# Patient Record
Sex: MALE | Race: WHITE | NOT HISPANIC OR LATINO | Employment: FULL TIME | URBAN - METROPOLITAN AREA
[De-identification: names, ages, dates, MRNs, and addresses within clinical notes are randomized per-mention and may not be internally consistent; named-entity substitution may affect disease eponyms.]

---

## 2019-07-04 ENCOUNTER — HOSPITAL ENCOUNTER (EMERGENCY)
Facility: MEDICAL CENTER | Age: 59
End: 2019-07-05
Attending: EMERGENCY MEDICINE
Payer: COMMERCIAL

## 2019-07-04 DIAGNOSIS — E86.0 DEHYDRATION: ICD-10-CM

## 2019-07-04 DIAGNOSIS — E03.9 HYPOTHYROIDISM, UNSPECIFIED TYPE: ICD-10-CM

## 2019-07-04 DIAGNOSIS — R55 SYNCOPE AND COLLAPSE: ICD-10-CM

## 2019-07-04 DIAGNOSIS — R93.0 ABNORMAL HEAD CT: ICD-10-CM

## 2019-07-04 DIAGNOSIS — E78.5 HYPERLIPIDEMIA, UNSPECIFIED HYPERLIPIDEMIA TYPE: ICD-10-CM

## 2019-07-04 PROCEDURE — 93005 ELECTROCARDIOGRAM TRACING: CPT | Performed by: EMERGENCY MEDICINE

## 2019-07-05 ENCOUNTER — APPOINTMENT (OUTPATIENT)
Dept: RADIOLOGY | Facility: MEDICAL CENTER | Age: 59
End: 2019-07-05
Attending: EMERGENCY MEDICINE
Payer: COMMERCIAL

## 2019-07-05 VITALS
BODY MASS INDEX: 32.21 KG/M2 | RESPIRATION RATE: 19 BRPM | HEART RATE: 75 BPM | TEMPERATURE: 98.4 F | DIASTOLIC BLOOD PRESSURE: 60 MMHG | WEIGHT: 225 LBS | SYSTOLIC BLOOD PRESSURE: 138 MMHG | OXYGEN SATURATION: 94 % | HEIGHT: 70 IN

## 2019-07-05 LAB
ALBUMIN SERPL BCP-MCNC: 4.2 G/DL (ref 3.2–4.9)
ALBUMIN/GLOB SERPL: 1.6 G/DL
ALP SERPL-CCNC: 63 U/L (ref 30–99)
ALT SERPL-CCNC: 42 U/L (ref 2–50)
ANION GAP SERPL CALC-SCNC: 9 MMOL/L (ref 0–11.9)
AST SERPL-CCNC: 26 U/L (ref 12–45)
BASOPHILS # BLD AUTO: 0.3 % (ref 0–1.8)
BASOPHILS # BLD: 0.03 K/UL (ref 0–0.12)
BILIRUB SERPL-MCNC: 0.3 MG/DL (ref 0.1–1.5)
BUN SERPL-MCNC: 19 MG/DL (ref 8–22)
CALCIUM SERPL-MCNC: 8.9 MG/DL (ref 8.5–10.5)
CHLORIDE SERPL-SCNC: 106 MMOL/L (ref 96–112)
CO2 SERPL-SCNC: 24 MMOL/L (ref 20–33)
CREAT SERPL-MCNC: 1.08 MG/DL (ref 0.5–1.4)
D DIMER PPP IA.FEU-MCNC: 1.05 UG/ML (FEU) (ref 0–0.5)
EKG IMPRESSION: NORMAL
EOSINOPHIL # BLD AUTO: 0.08 K/UL (ref 0–0.51)
EOSINOPHIL NFR BLD: 0.7 % (ref 0–6.9)
ERYTHROCYTE [DISTWIDTH] IN BLOOD BY AUTOMATED COUNT: 41 FL (ref 35.9–50)
GLOBULIN SER CALC-MCNC: 2.7 G/DL (ref 1.9–3.5)
GLUCOSE SERPL-MCNC: 138 MG/DL (ref 65–99)
HCT VFR BLD AUTO: 42.8 % (ref 42–52)
HGB BLD-MCNC: 14 G/DL (ref 14–18)
IMM GRANULOCYTES # BLD AUTO: 0.05 K/UL (ref 0–0.11)
IMM GRANULOCYTES NFR BLD AUTO: 0.5 % (ref 0–0.9)
INR PPP: 1.04 (ref 0.87–1.13)
LIPASE SERPL-CCNC: 21 U/L (ref 11–82)
LYMPHOCYTES # BLD AUTO: 1.28 K/UL (ref 1–4.8)
LYMPHOCYTES NFR BLD: 11.9 % (ref 22–41)
MCH RBC QN AUTO: 30.2 PG (ref 27–33)
MCHC RBC AUTO-ENTMCNC: 32.7 G/DL (ref 33.7–35.3)
MCV RBC AUTO: 92.4 FL (ref 81.4–97.8)
MONOCYTES # BLD AUTO: 0.64 K/UL (ref 0–0.85)
MONOCYTES NFR BLD AUTO: 5.9 % (ref 0–13.4)
NEUTROPHILS # BLD AUTO: 8.69 K/UL (ref 1.82–7.42)
NEUTROPHILS NFR BLD: 80.7 % (ref 44–72)
NRBC # BLD AUTO: 0 K/UL
NRBC BLD-RTO: 0 /100 WBC
PLATELET # BLD AUTO: 192 K/UL (ref 164–446)
PMV BLD AUTO: 8.8 FL (ref 9–12.9)
POTASSIUM SERPL-SCNC: 4.3 MMOL/L (ref 3.6–5.5)
PROT SERPL-MCNC: 6.9 G/DL (ref 6–8.2)
PROTHROMBIN TIME: 13.9 SEC (ref 12–14.6)
RBC # BLD AUTO: 4.63 M/UL (ref 4.7–6.1)
SODIUM SERPL-SCNC: 139 MMOL/L (ref 135–145)
TROPONIN I SERPL-MCNC: <0.01 NG/ML (ref 0–0.04)
WBC # BLD AUTO: 10.8 K/UL (ref 4.8–10.8)

## 2019-07-05 PROCEDURE — 85610 PROTHROMBIN TIME: CPT

## 2019-07-05 PROCEDURE — 71045 X-RAY EXAM CHEST 1 VIEW: CPT

## 2019-07-05 PROCEDURE — 85379 FIBRIN DEGRADATION QUANT: CPT

## 2019-07-05 PROCEDURE — 84484 ASSAY OF TROPONIN QUANT: CPT

## 2019-07-05 PROCEDURE — 83690 ASSAY OF LIPASE: CPT

## 2019-07-05 PROCEDURE — 99285 EMERGENCY DEPT VISIT HI MDM: CPT

## 2019-07-05 PROCEDURE — 700117 HCHG RX CONTRAST REV CODE 255: Performed by: EMERGENCY MEDICINE

## 2019-07-05 PROCEDURE — 71275 CT ANGIOGRAPHY CHEST: CPT

## 2019-07-05 PROCEDURE — 70450 CT HEAD/BRAIN W/O DYE: CPT

## 2019-07-05 PROCEDURE — 85025 COMPLETE CBC W/AUTO DIFF WBC: CPT

## 2019-07-05 PROCEDURE — 80053 COMPREHEN METABOLIC PANEL: CPT

## 2019-07-05 PROCEDURE — 36415 COLL VENOUS BLD VENIPUNCTURE: CPT

## 2019-07-05 PROCEDURE — 700105 HCHG RX REV CODE 258: Performed by: EMERGENCY MEDICINE

## 2019-07-05 RX ORDER — ATORVASTATIN CALCIUM 10 MG/1
TABLET, FILM COATED ORAL NIGHTLY
COMMUNITY

## 2019-07-05 RX ORDER — LEVOTHYROXINE SODIUM 0.1 MG/1
TABLET ORAL
COMMUNITY

## 2019-07-05 RX ORDER — SODIUM CHLORIDE, SODIUM LACTATE, POTASSIUM CHLORIDE, CALCIUM CHLORIDE 600; 310; 30; 20 MG/100ML; MG/100ML; MG/100ML; MG/100ML
1000 INJECTION, SOLUTION INTRAVENOUS ONCE
Status: COMPLETED | OUTPATIENT
Start: 2019-07-05 | End: 2019-07-05

## 2019-07-05 RX ADMIN — SODIUM CHLORIDE, POTASSIUM CHLORIDE, SODIUM LACTATE AND CALCIUM CHLORIDE 1000 ML: 600; 310; 30; 20 INJECTION, SOLUTION INTRAVENOUS at 00:21

## 2019-07-05 RX ADMIN — IOHEXOL 55 ML: 350 INJECTION, SOLUTION INTRAVENOUS at 02:55

## 2019-07-05 NOTE — ED NOTES
Pt off the unit now to imaging  Pt ambulatory to restroom with steady gait and standby rn assist to obtain urine sample

## 2019-07-05 NOTE — ED NOTES
Discharge instruction provided, verbally understood, denies any question, ambulated steady gait to lobby with wife.

## 2019-07-05 NOTE — ED PROVIDER NOTES
"ED Provider Note    CHIEF COMPLAINT  Chief Complaint   Patient presents with   • Syncope     patient from [facility], felt dizzy, fell three feet onto sand, per report bystanders say patient LOC approx. 1 min.  neg trauma, AOx4.        HPI    Primary care provider: Has back home in [facility]  Means of arrival: Ambulance  History obtained from: Patient and wife  History limited by: Nothing    Homer Oden is a 59 y.o. male who presents with an episode of syncope and collapse which occurred just prior to arrival.  The patient was in his usual state of health, he is visiting his vacation home near [facility] from [facility].  Today he was in a normal state of health and was able to play a full round of golf.  He was out with his wife for 4 July weekend, they were at a restaurant and stepped outside, they watched fireworks display him in the patient began to \"feel sick\".  He became lightheaded, and then proceeded to slump and collapsed in front of his wife.  He did not obviously strike his head or sustain any significant trauma.  He was per his wife unconscious for approximately 20 seconds, no shaking episode described.  He did not bite his tongue or lose continence.  No aggravating or alleviating factors noted.  No prior episodes.  Denies any current chest pain or dyspnea or nausea or vomiting.  No other recent illness.  No significant GI losses recently.  Only past medical history is mild hyperlipidemia and hypothyroidism.    REVIEW OF SYSTEMS  Constitutional: Negative for fever or chills.  Positive for an episode of syncope and collapse.  HENT: Negative for rhinorrhea or sore throat.    Eyes: Negative for vision changes.   Respiratory: Negative for cough or shortness of breath.    Cardiovascular: Negative for chest pain or palpitations.   Gastrointestinal: Negative for nausea, vomiting, or abdominal pain.   Genitourinary: Negative for dysuria or flank pain.   Musculoskeletal: Negative for back " "pain or joint pain.   Neurological: Negative for sensory or motor changes.   See HPI for further details. All other systems are negative.     PAST MEDICAL HISTORY  Hyperlipidemia and hypothyroidism.    PAST FAMILY HISTORY  Denies any pertinent past family history, specifically no cardiac or clotting history in his family.    SOCIAL HISTORY  Social History     Social History Main Topics   • Smoking status: Never Smoker   • Smokeless tobacco: Never Used   • Alcohol use Yes      Comment: social   • Drug use: Yes      Comment: marijuana nightly   • Sexual activity: Not on file       SURGICAL HISTORY  Knee replacement 7 months ago    CURRENT MEDICATIONS  Synthroid and atorvastatin    ALLERGIES  No Known Allergies    PHYSICAL EXAM  VITAL SIGNS: /60   Pulse 75   Temp 36.9 °C (98.4 °F) (Temporal)   Resp 19   Ht 1.778 m (5' 10\")   Wt 102.1 kg (225 lb)   SpO2 94%   BMI 32.28 kg/m²    Pulse ox interpretation: On room air, I interpret this pulse ox as normal.  Constitutional: Well-developed, well-nourished. Sitting up.   HEENT: Normocephalic, atraumatic. Posterior pharynx clear, mucous membranes dry.  Eyes:  EOMI. Normal sclerae.  PERRLA 3-2.  Neck: Supple, nontender.  Chest/Pulmonary: Clear to ausculation bilaterally, no wheezes or rhonchi.  Cardiovascular: Regular rate and rhythm, no murmur.   Abdomen: Soft, nontender; no rebound, guarding, or masses.  Back: No CVA or midline tenderness.   Musculoskeletal: No deformity or tenderness or edema.  Neuro: Clear speech, normal coordination, cranial nerves II-XII grossly intact, no focal asymmetry or sensory deficits.  Normal strength.  Normal sensation.  Psych: Normal mood and affect.  Skin: No rashes, warm and dry.      DIAGNOSTIC STUDIES / PROCEDURES    LABS & EKG  Results for orders placed or performed during the hospital encounter of 07/04/19   CBC WITH DIFFERENTIAL   Result Value Ref Range    WBC 10.8 4.8 - 10.8 K/uL    RBC 4.63 (L) 4.70 - 6.10 M/uL    Hemoglobin " 14.0 14.0 - 18.0 g/dL    Hematocrit 42.8 42.0 - 52.0 %    MCV 92.4 81.4 - 97.8 fL    MCH 30.2 27.0 - 33.0 pg    MCHC 32.7 (L) 33.7 - 35.3 g/dL    RDW 41.0 35.9 - 50.0 fL    Platelet Count 192 164 - 446 K/uL    MPV 8.8 (L) 9.0 - 12.9 fL    Neutrophils-Polys 80.70 (H) 44.00 - 72.00 %    Lymphocytes 11.90 (L) 22.00 - 41.00 %    Monocytes 5.90 0.00 - 13.40 %    Eosinophils 0.70 0.00 - 6.90 %    Basophils 0.30 0.00 - 1.80 %    Immature Granulocytes 0.50 0.00 - 0.90 %    Nucleated RBC 0.00 /100 WBC    Neutrophils (Absolute) 8.69 (H) 1.82 - 7.42 K/uL    Lymphs (Absolute) 1.28 1.00 - 4.80 K/uL    Monos (Absolute) 0.64 0.00 - 0.85 K/uL    Eos (Absolute) 0.08 0.00 - 0.51 K/uL    Baso (Absolute) 0.03 0.00 - 0.12 K/uL    Immature Granulocytes (abs) 0.05 0.00 - 0.11 K/uL    NRBC (Absolute) 0.00 K/uL   COMP METABOLIC PANEL   Result Value Ref Range    Sodium 139 135 - 145 mmol/L    Potassium 4.3 3.6 - 5.5 mmol/L    Chloride 106 96 - 112 mmol/L    Co2 24 20 - 33 mmol/L    Anion Gap 9.0 0.0 - 11.9    Glucose 138 (H) 65 - 99 mg/dL    Bun 19 8 - 22 mg/dL    Creatinine 1.08 0.50 - 1.40 mg/dL    Calcium 8.9 8.5 - 10.5 mg/dL    AST(SGOT) 26 12 - 45 U/L    ALT(SGPT) 42 2 - 50 U/L    Alkaline Phosphatase 63 30 - 99 U/L    Total Bilirubin 0.3 0.1 - 1.5 mg/dL    Albumin 4.2 3.2 - 4.9 g/dL    Total Protein 6.9 6.0 - 8.2 g/dL    Globulin 2.7 1.9 - 3.5 g/dL    A-G Ratio 1.6 g/dL   TROPONIN   Result Value Ref Range    Troponin I <0.01 0.00 - 0.04 ng/mL   LIPASE   Result Value Ref Range    Lipase 21 11 - 82 U/L   PROTHROMBIN TIME (INR)   Result Value Ref Range    PT 13.9 12.0 - 14.6 sec    INR 1.04 0.87 - 1.13   D-DIMER   Result Value Ref Range    D-Dimer Screen 1.05 (H) 0.00 - 0.50 ug/mL (FEU)   ESTIMATED GFR   Result Value Ref Range    GFR If African American >60 >60 mL/min/1.73 m 2    GFR If Non African American >60 >60 mL/min/1.73 m 2     Unfortunately the patient's EKG did not uploaded into our digital system but it showed normal sinus  rhythm with no STEMI or strain or dysrhythmia    RADIOLOGY  CT-CTA CHEST PULMONARY ARTERY W/ RECONS   Final Result      1.  No CT evidence for pulmonary emboli.   2.  No pneumonia or pneumothorax.            CT-HEAD W/O   Final Result      1.  No acute intracranial abnormality.   2.  Small chronic RIGHT cerebellar infarct.      DX-CHEST-PORTABLE (1 VIEW)   Final Result      Hypoinflation with minimal LEFT lung base atelectasis.          COURSE & MEDICAL DECISION MAKING    This is a 59 y.o. male who presents with an episode of syncope and collapse.    Differential Diagnosis includes but is not limited to:  Syncope, seizure, subarachnoid hemorrhage, PE, dysrhythmia, vasovagal episode, dehydration, electrolyte abnormality    ED Course:  Given the patient's age and no prior episode of syncope and collapse plan a broad work-up to include labs, EKG, advanced imaging, he will be kept on telemetry monitoring.  He appears slightly dehydrated with dry mucous membranes, in case a surgical process is present I will keep him n.p.o., and I am suspicious for dehydration so we will treat him with a crystalloid fluid bolus.    Thankfully the patient's work-up is reassuring EKG without STEMI or strain or dysrhythmia, CBC reassuring no fevers white count normal doubt infection.  He is not anemic.  Electrolytes are stable without severe acidosis.  Mild hyperglycemia.  Troponin negative doubt ACS.  D-dimer positive plan CTA of the chest to rule out PE.  In case this is a possible occult subarachnoid hemorrhage or possible first-time seizure we will also obtain advanced imaging of his brain.    CT of the chest reassuring no pneumonia, pneumothorax, or PE.  No acute findings on head CT but he does have a small chronic right cerebellar infarct.  Chest x-ray with hyperinflation.    On recheck the patient is resting comfortably and feeling better thus I feel he has had a positive response to parenteral rehydration.  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  He is thrown no dysrhythmias after many hours of observation due to delay in CT imaging tonight, and he is asymptomatic.  He is been able to ambulate without symptoms.  He did golf yesterday and it is warm outside at this time, perhaps he was slightly dehydrated leading to this episode or vasovagal stimulation.  Dysrhythmia still a possibility, so I have asked the patient to please follow-up with his primary care provider soon as he gets home to [place] for possible long-term cardiac monitoring and probable advanced imaging of his brain to evaluate for possible occult stroke.  I do not feel a stroke contributed to his symptoms tonight.  This appears chronic.  The patient and wife will return to the ER immediately for any recurrent events or any other new or worsening symptoms.    Medications   LR infusion (bolus) (0 mL Intravenous Stopped 7/5/19 0315)   iohexol (OMNIPAQUE) 350 mg/mL (55 mL Intravenous Given 7/5/19 0255)     FINAL IMPRESSION  1. Syncope and collapse    2. Abnormal head CT    3. Dehydration    4. Hypothyroidism, unspecified type    5. Hyperlipidemia, unspecified hyperlipidemia type        PRESCRIPTIONS  Discharge Medication List as of 7/5/2019  3:54 AM          FOLLOW UP  [facility], Emergency Dept  [address] [id number] [phone]  Today  If you have ANY new or worse symptoms!    Your primary care provider    Schedule an appointment as soon as possible for a visit in 3 days  for recheck, heart monitoring, and further brain imaging      -DISCHARGE-     Results, exam findings, clinical impression, presumed diagnosis, treatment options, and strict return precautions were discussed with the patient and family, and they verbalized understanding, agreed with, and appreciated the plan of care.    Pertinent Labs & Imaging studies reviewed and  verified by myself, as well as nursing notes and the patient's past medical, family, and social histories (See chart for details).    The patient is referred to a primary physician for blood pressure management, diabetic screening, and for all other preventative health concerns.     Portions of this record were made with voice recognition software.  Despite my review, spelling/grammar/context errors may still remain.  Interpretation of this chart should be taken in this context.    Electronically signed by Gio uLa on 7/5/2019 at 6:48 AM.

## 2019-07-05 NOTE — DISCHARGE INSTRUCTIONS
You were seen and evaluated in the Emergency Department at [facility] for:     Losing consciousness and collapsing    You had the following tests and studies:    Thankfully your work-up today is quite reassuring.  Your EKG is stable, there is no evidence of damage to your heart, your letter lites are stable.  We were concerned about a possible blood clot but your CT of your chest shows no blood clots or other dangerous cause of your episode of passing out.  Your brain scan shows what could be an old stroke in your cerebellum so we asked that you please follow-up with your primary care provider regarding this.    You received the following medications:    IV fluids.  ----------------------------    Please make sure to follow up with:    Your primary care provider for possible long-term cardiac monitoring, follow-up of your brain scan, and for routine health care.  However, if you have any recurrent episodes of passing out, chest pain, trouble breathing, vomiting, or any other new or worsening symptoms please return to the ER immediately.    Good luck, we hope you get better soon!  ----------------------------    We always encourage patients to return IMMEDIATELY if they have:  Increased or changing pain, passing out, fevers over 100.4 (taken in your mouth or rectally) for more than 2 days, redness or swelling of skin or tissues, feeling like your heart is beating fast, chest pain that is new or worsening, trouble breathing, feeling like your throat is closing up and can not breath, inability to walk, weakness of any part of your body, new dizziness, severe bleeding that won't stop from any part of your body, if you can't eat or drink, or if you have any other concerns.   If you feel worse, please know that you can always return with any questions, concerns, worse symptoms, or you are feeling unsafe. We certainly cannot say for sure that we have ruled out every illness or dangerous disease, but we feel that  at this specific time, your exam, tests, and vital signs like heart rate and blood pressure are safe for discharge.

## 2024-12-18 NOTE — ED TRIAGE NOTES
Patient brought in by EMS for   Chief Complaint   Patient presents with   • Syncope     patient from [facility], felt dizzy, fell three feet onto sand, per report bystanders say patient LOC approx. 1 min.  neg trauma, AOx4.        Patient from [facility] up for the 4th.  No cardiac history.  In NAD. Chart up for ERP   [Follow-Up Visit] : a follow-up [Home] : at home, [unfilled] , at the time of the visit. [Medical Office: ([facility])___] : at the medical office located in  [Patient] : the patient [Self] : self [This encounter was initiated by telehealth (audio with video) and converted to telephone (audio only) due to technical difficulties.] : This encounter was initiated by telehealth (audio with video) and converted to telephone (audio only) due to technical difficulties. [Family Member] : family member